# Patient Record
Sex: MALE | Employment: FULL TIME | RURAL
[De-identification: names, ages, dates, MRNs, and addresses within clinical notes are randomized per-mention and may not be internally consistent; named-entity substitution may affect disease eponyms.]

---

## 2021-01-18 ENCOUNTER — OFFICE VISIT (OUTPATIENT)
Dept: PRIMARY CARE CLINIC | Age: 41
End: 2021-01-18

## 2021-01-18 DIAGNOSIS — Z20.822 ENCOUNTER FOR LABORATORY TESTING FOR COVID-19 VIRUS: Primary | ICD-10-CM

## 2021-01-20 LAB — SARS-COV-2, NAA: NOT DETECTED

## 2021-12-31 ENCOUNTER — HOSPITAL ENCOUNTER (EMERGENCY)
Age: 41
Discharge: HOME | End: 2021-12-31
Attending: EMERGENCY MEDICINE
Payer: MEDICAID

## 2021-12-31 VITALS
TEMPERATURE: 98.3 F | SYSTOLIC BLOOD PRESSURE: 190 MMHG | HEART RATE: 73 BPM | DIASTOLIC BLOOD PRESSURE: 108 MMHG | WEIGHT: 220 LBS | OXYGEN SATURATION: 98 % | RESPIRATION RATE: 16 BRPM

## 2021-12-31 DIAGNOSIS — I10 ELEVATED BLOOD PRESSURE READING WITH DIAGNOSIS OF HYPERTENSION: ICD-10-CM
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
## 2021-12-31 DIAGNOSIS — S16.1XXA NECK STRAIN, INITIAL ENCOUNTER: Primary | ICD-10-CM

## 2021-12-31 PROCEDURE — 99281 EMR DPT VST MAYX REQ PHY/QHP: CPT

## 2021-12-31 RX ORDER — IBUPROFEN 600 MG/1
600 TABLET ORAL
Qty: 20 TABLET | Refills: 0 | Status: SHIPPED | OUTPATIENT
Start: 2021-12-31

## 2021-12-31 RX ORDER — HYDROCHLOROTHIAZIDE 50 MG/1
50 TABLET ORAL DAILY
Qty: 10 TABLET | Refills: 0 | Status: SHIPPED | OUTPATIENT
Start: 2021-12-31 | End: 2022-01-10

## 2021-12-31 NOTE — Clinical Note
[address] [place] [address] EMERGENCY DEP  [address] [place] [address] Dr Estella Castrejon [id number]  [phone]    Work/School Note    Date: 12/31/2021    To Whom It May concern:      Khadar Ghotra was seen and treated today in the emergency room by the following provider(s):  Attending Provider: Sarah Abdul MD.      Khadar Ghotra is excused from work/school on 12/31/21. He is clear to return to work/school on 01/01/22. Sincerely,          Brennan Stern.  MD Naomi
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
## 2023-05-22 RX ORDER — IBUPROFEN 600 MG/1
600 TABLET ORAL EVERY 6 HOURS PRN
COMMUNITY
Start: 2021-12-31